# Patient Record
Sex: MALE | Race: WHITE | NOT HISPANIC OR LATINO | Employment: OTHER | URBAN - METROPOLITAN AREA
[De-identification: names, ages, dates, MRNs, and addresses within clinical notes are randomized per-mention and may not be internally consistent; named-entity substitution may affect disease eponyms.]

---

## 2018-10-09 ENCOUNTER — ESTABLISHED COMPREHENSIVE EXAM (OUTPATIENT)
Dept: URBAN - METROPOLITAN AREA CLINIC 36 | Facility: CLINIC | Age: 78
End: 2018-10-09

## 2018-10-09 DIAGNOSIS — H35.371: ICD-10-CM

## 2018-10-09 DIAGNOSIS — H26.492: ICD-10-CM

## 2018-10-09 DIAGNOSIS — H04.123: ICD-10-CM

## 2018-10-09 PROCEDURE — 1036F TOBACCO NON-USER: CPT

## 2018-10-09 PROCEDURE — 92014 COMPRE OPH EXAM EST PT 1/>: CPT

## 2018-10-09 PROCEDURE — G9903 PT SCRN TBCO ID AS NON USER: HCPCS

## 2018-10-09 PROCEDURE — G8427 DOCREV CUR MEDS BY ELIG CLIN: HCPCS

## 2018-10-09 ASSESSMENT — VISUAL ACUITY
OU_SC: J1
OS_SC: 20/50
OS_PH: 20/25
OD_SC: J4
OS_SC: J1
OD_SC: 20/20

## 2018-10-09 ASSESSMENT — TONOMETRY
OS_IOP_MMHG: 13
OD_IOP_MMHG: 12
OS_IOP_MMHG: 12

## 2019-10-09 ENCOUNTER — ESTABLISHED COMPREHENSIVE EXAM (OUTPATIENT)
Dept: URBAN - METROPOLITAN AREA CLINIC 36 | Facility: CLINIC | Age: 79
End: 2019-10-09

## 2019-10-09 DIAGNOSIS — H02.834: ICD-10-CM

## 2019-10-09 DIAGNOSIS — H26.492: ICD-10-CM

## 2019-10-09 DIAGNOSIS — H40.013: ICD-10-CM

## 2019-10-09 PROCEDURE — 92014 COMPRE OPH EXAM EST PT 1/>: CPT

## 2019-10-09 PROCEDURE — 92015 DETERMINE REFRACTIVE STATE: CPT

## 2019-10-09 ASSESSMENT — VISUAL ACUITY
OS_SC: J2
OD_SC: 20/20
OS_SC: 20/40
OD_SC: J4
OS_PH: 20/25

## 2020-11-16 ENCOUNTER — ESTABLISHED COMPREHENSIVE EXAM (OUTPATIENT)
Dept: URBAN - METROPOLITAN AREA CLINIC 36 | Facility: CLINIC | Age: 80
End: 2020-11-16

## 2020-11-16 DIAGNOSIS — H02.834: ICD-10-CM

## 2020-11-16 DIAGNOSIS — H04.123: ICD-10-CM

## 2020-11-16 DIAGNOSIS — H02.831: ICD-10-CM

## 2020-11-16 DIAGNOSIS — H52.7: ICD-10-CM

## 2020-11-16 PROCEDURE — 92015 DETERMINE REFRACTIVE STATE: CPT

## 2020-11-16 PROCEDURE — 92014 COMPRE OPH EXAM EST PT 1/>: CPT

## 2020-11-16 ASSESSMENT — VISUAL ACUITY
OS_PH: 20/30
OS_SC: 20/40-1
OS_SC: J5
OD_SC: 20/20-2
OD_SC: J10

## 2020-11-16 ASSESSMENT — TONOMETRY
OD_IOP_MMHG: 13
OS_IOP_MMHG: 12

## 2021-05-03 ENCOUNTER — ESTABLISHED COMPREHENSIVE EXAM (OUTPATIENT)
Dept: URBAN - METROPOLITAN AREA CLINIC 36 | Facility: CLINIC | Age: 81
End: 2021-05-03

## 2021-05-03 DIAGNOSIS — H04.123: ICD-10-CM

## 2021-05-03 DIAGNOSIS — H53.10: ICD-10-CM

## 2021-05-03 DIAGNOSIS — H52.7: ICD-10-CM

## 2021-05-03 PROCEDURE — 92014 COMPRE OPH EXAM EST PT 1/>: CPT

## 2021-05-03 ASSESSMENT — VISUAL ACUITY
OD_SC: 20/25+2
OS_PH: 20/30-1
OD_PH: 20/20-2
OS_SC: 20/40

## 2021-05-03 ASSESSMENT — TONOMETRY
OD_IOP_MMHG: 14
OS_IOP_MMHG: 12

## 2021-09-22 NOTE — PATIENT DISCUSSION
Recommend Bilateral upper lid blepharoplasty. Predeterm (discussed risks and benefits of sx. ..). Discussed that FAT is not covered by insurance and considered cosmetic.

## 2021-11-17 ENCOUNTER — ESTABLISHED COMPREHENSIVE EXAM (OUTPATIENT)
Dept: URBAN - METROPOLITAN AREA CLINIC 36 | Facility: CLINIC | Age: 81
End: 2021-11-17

## 2021-11-17 DIAGNOSIS — H40.013: ICD-10-CM

## 2021-11-17 DIAGNOSIS — H35.371: ICD-10-CM

## 2021-11-17 DIAGNOSIS — H43.393: ICD-10-CM

## 2021-11-17 DIAGNOSIS — H02.831: ICD-10-CM

## 2021-11-17 DIAGNOSIS — H53.10: ICD-10-CM

## 2021-11-17 PROCEDURE — 92014 COMPRE OPH EXAM EST PT 1/>: CPT

## 2021-11-17 PROCEDURE — 92133 CPTRZD OPH DX IMG PST SGM ON: CPT

## 2021-11-17 ASSESSMENT — VISUAL ACUITY
OS_SC: 20/40
OD_SC: 20/30+1
OD_SC: J6
OS_PH: 20/30-2
OD_PH: 20/25
OS_SC: J3

## 2021-11-17 ASSESSMENT — TONOMETRY
OS_IOP_MMHG: 14
OD_IOP_MMHG: 14

## 2022-11-21 ENCOUNTER — COMPREHENSIVE EXAM (OUTPATIENT)
Dept: URBAN - METROPOLITAN AREA CLINIC 36 | Facility: CLINIC | Age: 82
End: 2022-11-21

## 2022-11-21 DIAGNOSIS — H40.013: ICD-10-CM

## 2022-11-21 DIAGNOSIS — Z96.1: ICD-10-CM

## 2022-11-21 DIAGNOSIS — H02.831: ICD-10-CM

## 2022-11-21 DIAGNOSIS — H52.7: ICD-10-CM

## 2022-11-21 PROCEDURE — 92014 COMPRE OPH EXAM EST PT 1/>: CPT

## 2022-11-21 ASSESSMENT — VISUAL ACUITY
OD_SC: J5
OS_SC: J3
OD_SC: 20/25
OS_PH: 20/40
OS_SC: 20/70+2

## 2022-11-21 ASSESSMENT — TONOMETRY
OD_IOP_MMHG: 14
OD_IOP_MMHG: 11
OS_IOP_MMHG: 12
OS_IOP_MMHG: 14

## 2023-05-24 ENCOUNTER — FOLLOW UP (OUTPATIENT)
Dept: URBAN - METROPOLITAN AREA CLINIC 36 | Facility: CLINIC | Age: 83
End: 2023-05-24

## 2023-05-24 DIAGNOSIS — H40.023: ICD-10-CM

## 2023-05-24 PROCEDURE — 92012 INTRM OPH EXAM EST PATIENT: CPT

## 2023-05-24 PROCEDURE — 92133 CPTRZD OPH DX IMG PST SGM ON: CPT

## 2023-05-24 PROCEDURE — 92083 EXTENDED VISUAL FIELD XM: CPT

## 2023-05-24 ASSESSMENT — TONOMETRY
OS_IOP_MMHG: 10
OD_IOP_MMHG: 08

## 2023-05-24 ASSESSMENT — VISUAL ACUITY
OS_SC: 20/70
OS_PH: 20/30-1
OD_SC: 20/20

## 2023-11-29 ENCOUNTER — COMPREHENSIVE EXAM (OUTPATIENT)
Dept: URBAN - METROPOLITAN AREA CLINIC 36 | Facility: CLINIC | Age: 83
End: 2023-11-29

## 2023-11-29 DIAGNOSIS — H02.831: ICD-10-CM

## 2023-11-29 DIAGNOSIS — H35.371: ICD-10-CM

## 2023-11-29 DIAGNOSIS — H43.393: ICD-10-CM

## 2023-11-29 DIAGNOSIS — H02.834: ICD-10-CM

## 2023-11-29 DIAGNOSIS — H40.013: ICD-10-CM

## 2023-11-29 DIAGNOSIS — H43.811: ICD-10-CM

## 2023-11-29 DIAGNOSIS — Z96.1: ICD-10-CM

## 2023-11-29 PROCEDURE — 92014 COMPRE OPH EXAM EST PT 1/>: CPT

## 2023-11-29 ASSESSMENT — TONOMETRY
OS_IOP_MMHG: 09
OD_IOP_MMHG: 09

## 2023-11-29 ASSESSMENT — VISUAL ACUITY
OD_SC: 20/30-1
OS_SC: J3
OD_SC: J5
OS_SC: 20/40